# Patient Record
Sex: FEMALE | Race: BLACK OR AFRICAN AMERICAN | NOT HISPANIC OR LATINO | Employment: OTHER | URBAN - METROPOLITAN AREA
[De-identification: names, ages, dates, MRNs, and addresses within clinical notes are randomized per-mention and may not be internally consistent; named-entity substitution may affect disease eponyms.]

---

## 2024-02-08 ENCOUNTER — HOSPITAL ENCOUNTER (OUTPATIENT)
Dept: RADIOLOGY | Facility: CLINIC | Age: 74
Discharge: HOME | End: 2024-02-08
Payer: MEDICARE

## 2024-02-08 ENCOUNTER — OFFICE VISIT (OUTPATIENT)
Dept: GASTROENTEROLOGY | Facility: CLINIC | Age: 74
End: 2024-02-08
Payer: MEDICARE

## 2024-02-08 VITALS
SYSTOLIC BLOOD PRESSURE: 132 MMHG | OXYGEN SATURATION: 100 % | WEIGHT: 143.9 LBS | RESPIRATION RATE: 18 BRPM | BODY MASS INDEX: 22.58 KG/M2 | HEIGHT: 67 IN | TEMPERATURE: 97.5 F | DIASTOLIC BLOOD PRESSURE: 82 MMHG | HEART RATE: 64 BPM

## 2024-02-08 DIAGNOSIS — R10.11 RIGHT UPPER QUADRANT ABDOMINAL PAIN: ICD-10-CM

## 2024-02-08 DIAGNOSIS — R10.11 RIGHT UPPER QUADRANT ABDOMINAL PAIN: Primary | ICD-10-CM

## 2024-02-08 PROCEDURE — 1159F MED LIST DOCD IN RCRD: CPT | Performed by: INTERNAL MEDICINE

## 2024-02-08 PROCEDURE — 1036F TOBACCO NON-USER: CPT | Performed by: INTERNAL MEDICINE

## 2024-02-08 PROCEDURE — 74019 RADEX ABDOMEN 2 VIEWS: CPT | Performed by: RADIOLOGY

## 2024-02-08 PROCEDURE — 74019 RADEX ABDOMEN 2 VIEWS: CPT

## 2024-02-08 PROCEDURE — 99203 OFFICE O/P NEW LOW 30 MIN: CPT | Performed by: INTERNAL MEDICINE

## 2024-02-08 RX ORDER — MELOXICAM 15 MG/1
15 TABLET ORAL
COMMUNITY
Start: 2023-08-10

## 2024-02-08 RX ORDER — FAMOTIDINE 20 MG/1
20 TABLET, FILM COATED ORAL 2 TIMES DAILY
COMMUNITY
Start: 2024-02-05 | End: 2024-02-19

## 2024-02-08 RX ORDER — OMEPRAZOLE 20 MG/1
20 TABLET, DELAYED RELEASE ORAL
Qty: 30 TABLET | Refills: 0 | Status: SHIPPED | OUTPATIENT
Start: 2024-02-08 | End: 2024-03-09

## 2024-02-08 RX ORDER — ALUMINUM HYDROXIDE, MAGNESIUM HYDROXIDE, AND SIMETHICONE 1200; 120; 1200 MG/30ML; MG/30ML; MG/30ML
SUSPENSION ORAL
COMMUNITY
Start: 2024-02-05 | End: 2024-02-15

## 2024-02-08 RX ORDER — AMLODIPINE BESYLATE 2.5 MG/1
2.5 TABLET ORAL
COMMUNITY
Start: 2024-02-06

## 2024-02-08 ASSESSMENT — ENCOUNTER SYMPTOMS
ENDOCRINE NEGATIVE: 1
NEUROLOGICAL NEGATIVE: 1
CARDIOVASCULAR NEGATIVE: 1
CONSTITUTIONAL NEGATIVE: 1
RESPIRATORY NEGATIVE: 1

## 2024-02-08 NOTE — PATIENT INSTRUCTIONS
Right upper quadrant abdominal pain  -     XR abdomen 2 views supine and erect or decub; Future  -     US abdomen complete; Future  -     omeprazole OTC (PriLOSEC OTC) 20 mg EC tablet; Take 1 tablet (20 mg) by mouth once daily in the morning. Take before meals. Do not crush, chew, or split.    If no improvement in symptoms in next 3-4 days, contact my office for upper endoscopy.

## 2024-02-08 NOTE — PROGRESS NOTES
Subjective   Patient ID: Claritza Manuel is a 73 y.o. female who presents for Abdominal Pain (Pt went to [facility] Er on 02/05/24 and dx with Gastritis. States the severe pain is causing her to not be able to eat. ).  HPI  Patient was seen at [facility] emergency room on fifth February 24 with complaints of epigastric pain that started acutely the day before after she ate greasy fish and veggies.  She has a history of developing stopped stomach pain after eating greasy food. Still has pain adter meals.   Not on PPI   She had labs normal CMP, lipase normal, CBC normal overall benign urine analysis.  No cross-sectional imaging was done.  Past medical history significant for GERD hypertension s/p hysterectomy mild obstructive sleep apnea.  Last colonoscopy was in 2004 EGD 2006.    Generlaized, 5/10 not associated with N/V/D.  She has had it before and sh ehas to watch her diet.     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         Current Outpatient Medications on File Prior to Visit   Medication Sig Dispense Refill    alum-mag hydroxide-simeth (Mylanta) 200-200-20 mg/5 mL oral suspension Take 10 mL by mouth between meals and at bedtime as needed for up to 7 days.      amLODIPine (Norvasc) 2.5 mg tablet Take 1 tablet (2.5 mg) by mouth once daily.      famotidine (Pepcid) 20 mg tablet Take 1 tablet (20 mg) by mouth twice a day.      meloxicam (Mobic) 15 mg tablet Take 1 tablet (15 mg) by mouth once daily.       No current facility-administered medications on file prior to visit.        Review of Systems   Constitutional: Negative.    Respiratory: Negative.     Cardiovascular: Negative.    Endocrine: Negative.    Genitourinary: Negative.    Skin: Negative.    Neurological: Negative.        Objective   Physical Exam  Constitutional:       Appearance: Normal appearance.   HENT:      Head: Normocephalic and atraumatic.   Cardiovascular:  "     Rate and Rhythm: Normal rate and regular rhythm.      Pulses: Normal pulses.      Heart sounds: Normal heart sounds.   Pulmonary:      Effort: Pulmonary effort is normal.      Breath sounds: Normal breath sounds.   Abdominal:      General: Abdomen is flat. Bowel sounds are normal.      Palpations: Abdomen is soft.      Tenderness: There is abdominal tenderness.   Musculoskeletal:         General: Normal range of motion.      Cervical back: Normal range of motion.   Skin:     General: Skin is warm.   Neurological:      Mental Status: She is alert.       /82 (BP Location: Right arm, Patient Position: Sitting, BP Cuff Size: Adult)   Pulse 64   Temp 36.4 °C (97.5 °F) (Temporal)   Resp 18   Ht 1.702 m (5' 7\")   Wt 65.3 kg (143 lb 14.4 oz)   SpO2 100%   BMI 22.54 kg/m²      No results found for: \"WBC\", \"HGB\", \"HCT\", \"MCV\", \"PLT\"        No lab exists for component: \"LABALBU\"    No results found for: \"AFP\"  No results found for: \"TSH\"      Assessment/Plan   Diagnoses and all orders for this visit:  Right upper quadrant abdominal pain  -     XR abdomen 2 views supine and erect or decub; Future  -     US abdomen complete; Future  -     omeprazole OTC (PriLOSEC OTC) 20 mg EC tablet; Take 1 tablet (20 mg) by mouth once daily in the morning. Take before meals. Do not crush, chew, or split.    If no improvement in symptoms in next 3-4 days, contact my office for upper endoscopy.          "

## 2024-02-09 ENCOUNTER — APPOINTMENT (OUTPATIENT)
Dept: RADIOLOGY | Facility: CLINIC | Age: 74
End: 2024-02-09
Payer: MEDICARE

## 2024-02-12 ENCOUNTER — HOSPITAL ENCOUNTER (OUTPATIENT)
Dept: RADIOLOGY | Facility: CLINIC | Age: 74
Discharge: HOME | End: 2024-02-12
Payer: MEDICARE

## 2024-02-12 DIAGNOSIS — R10.11 RIGHT UPPER QUADRANT ABDOMINAL PAIN: ICD-10-CM

## 2024-02-12 PROCEDURE — 76700 US EXAM ABDOM COMPLETE: CPT

## 2024-02-12 PROCEDURE — 76700 US EXAM ABDOM COMPLETE: CPT | Performed by: RADIOLOGY

## 2024-02-13 NOTE — RESULT ENCOUNTER NOTE
X-ray abdomen is normal.  If you continue to have the abdominal pain please reach out to my office and we will schedule you for upper endoscopy.  Sincerely,

## 2024-02-13 NOTE — RESULT ENCOUNTER NOTE
Ultrasound results are completely normal.  If you continue to have abdominal pain please call my office and we can schedule you for upper endoscopy for further evaluation.  Sincerely,

## 2024-02-27 ENCOUNTER — HOSPITAL ENCOUNTER (OUTPATIENT)
Dept: RADIOLOGY | Facility: CLINIC | Age: 74
Discharge: HOME | End: 2024-02-27
Payer: MEDICARE

## 2024-02-27 ENCOUNTER — OFFICE VISIT (OUTPATIENT)
Dept: ORTHOPEDIC SURGERY | Facility: CLINIC | Age: 74
End: 2024-02-27
Payer: MEDICARE

## 2024-02-27 DIAGNOSIS — M25.552 HIP PAIN, BILATERAL: ICD-10-CM

## 2024-02-27 DIAGNOSIS — M25.551 HIP PAIN, BILATERAL: ICD-10-CM

## 2024-02-27 DIAGNOSIS — M47.818 SI JOINT ARTHRITIS: Primary | ICD-10-CM

## 2024-02-27 PROCEDURE — 99204 OFFICE O/P NEW MOD 45 MIN: CPT | Performed by: STUDENT IN AN ORGANIZED HEALTH CARE EDUCATION/TRAINING PROGRAM

## 2024-02-27 PROCEDURE — 99214 OFFICE O/P EST MOD 30 MIN: CPT | Performed by: STUDENT IN AN ORGANIZED HEALTH CARE EDUCATION/TRAINING PROGRAM

## 2024-02-27 PROCEDURE — 73521 X-RAY EXAM HIPS BI 2 VIEWS: CPT | Mod: BILATERAL PROCEDURE | Performed by: RADIOLOGY

## 2024-02-27 PROCEDURE — 1159F MED LIST DOCD IN RCRD: CPT | Performed by: STUDENT IN AN ORGANIZED HEALTH CARE EDUCATION/TRAINING PROGRAM

## 2024-02-27 PROCEDURE — 73521 X-RAY EXAM HIPS BI 2 VIEWS: CPT

## 2024-02-27 PROCEDURE — 1036F TOBACCO NON-USER: CPT | Performed by: STUDENT IN AN ORGANIZED HEALTH CARE EDUCATION/TRAINING PROGRAM

## 2024-02-27 NOTE — PROGRESS NOTES
PRIMARY CARE PHYSICIAN: No primary care provider on file.  REFERRING PROVIDER: No referring provider defined for this encounter.       SUBJECTIVE  CHIEF COMPLAINT:   Chief Complaint   Patient presents with    Left Hip - Pain    Right Hip - Pain        HPI: Claritza Manuel is a pleasant 73 y.o. year-old female who is seen today for evaluation of bilateral hip pain (R = L). The pain has been present for years. The onset of pain was not associated with a traumatic injury, although she has had several falls this past year.  Claritza Manuel states that the pain is located posteriorly bilaterally. Denies groin pain, no C sign.  Claritza Manuel denies any history of inflammatory arthritis.  The pain is aggravated by weightbearing and alleviated by rest. She has not tried formal PT, injections. She does stretching at home and takes NSAIDs as needed. The patient denies any numbness or tingling of the right lower extremity.    FUNCTIONAL STATUS: not limited.  AMBULATORY STATUS: Independent community ambulation without devices  PREVIOUS TREATMENTS: activity modification and over the counter medications   HISTORY OF SURGERY ON AFFECTED HIP(S): No   BACK PAIN REPORTED: Yes   SYMPTOMS INTERFERING WITH SLEEP: No   INSTABILITY: No   IMPACTING QUALITY OF LIFE: No     REVIEW OF SYSTEMS  The patient denies any fever, chills, chest pain, shortness of breath or difficulty breathing.  Patient denies any numbness, tingling, or radicular symptoms.  Adult patient history sheet was filled out by the patient today in clinic and will be scanned into the EMR.  I personally reviewed this form which will be scanned into the EMR.  This includes Past Medical History, Past Surgical History, Medications, Allergies, Social History, Family History and 12 point review of systems.    History reviewed. No pertinent past medical history.     Allergies   Allergen Reactions    Clindamycin Hives and Itching    Erythromycin Hives and Itching    Hay Fever And  Allergy Relief Itching    Penicillins Hives and Itching    Sulfa (Sulfonamide Antibiotics) Hives and Itching    Tetracycline Hives and Itching        Past Surgical History:   Procedure Laterality Date    BOWEL RESECTION  2005    [facility]    PARTIAL HYSTERECTOMY N/A         No family history on file.     Social History     Socioeconomic History    Marital status: Single     Spouse name: Not on file    Number of children: Not on file    Years of education: Not on file    Highest education level: Not on file   Occupational History    Not on file   Tobacco Use    Smoking status: Never    Smokeless tobacco: Never   Vaping Use    Vaping Use: Never used   Substance and Sexual Activity    Alcohol use: Yes     Alcohol/week: 1.0 standard drink of alcohol     Types: 1 Standard drinks or equivalent per week    Drug use: Never    Sexual activity: Not on file   Other Topics Concern    Not on file   Social History Narrative    Not on file     Social Determinants of Health     Financial Resource Strain: Not on file   Food Insecurity: Not on file   Transportation Needs: Not on file   Physical Activity: Not on file   Stress: Not on file   Social Connections: Not on file   Intimate Partner Violence: Not on file   Housing Stability: Not on file        CURRENT MEDICATIONS:   Current Outpatient Medications   Medication Sig Dispense Refill    amLODIPine (Norvasc) 2.5 mg tablet Take 1 tablet (2.5 mg) by mouth once daily.      famotidine (Pepcid) 20 mg tablet Take 1 tablet (20 mg) by mouth twice a day.      meloxicam (Mobic) 15 mg tablet Take 1 tablet (15 mg) by mouth once daily.      omeprazole OTC (PriLOSEC OTC) 20 mg EC tablet Take 1 tablet (20 mg) by mouth once daily in the morning. Take before meals. Do not crush, chew, or split. 30 tablet 0     No current facility-administered medications for this visit.        OBJECTIVE    PHYSICAL EXAM  There is no height or weight on file to calculate BMI.    General: Well-appearing female  in no acute distress.  Awake, alert and oriented.  Pleasant and cooperative.  Respiratory: Non-labored breathing  Mood: Euthymic   Gait: Normal  Assistive Device: None     Limb Length Discrepancy: no    Affected Right Hip  Range of motion:   Flexion: 120  Extension: 0  Internal Rotation: 25  External Rotation: 40  Hip Flexor Strength: 5/5  Abductor Strength: 5/5  Adductor Strength: 5/5  Tenderness: glute  Neg log roll  Neg stinchfield  Neg FADDIR  Neg resisted abduction  Sensation: Intact to light touch distally  Motor function: Able to fire TA, EHL, G/S  Pulses: Palpable DP pulse    Affected Left Hip  Range of motion:   Flexion: 120  Extension: 0  Internal Rotation: 25  External Rotation: 40  Hip Flexor Strength: 5/5  Abductor Strength: 5/5  Adductor Strength: 5/5  Tenderness: PSIS  Sensation: Intact to light touch distally  Motor function: Able to fire TA, EHL, G/S  Neg log roll  Neg stinchfield  Neg FADDIR  Neg resisted abduction    IMAGING:  AP pelvis, AP hip and false profile views: Independent review of bilateral hip and pelvis x-rays was performed. The findings were reviewed with the patient. There are mild degenerative changes of both hips without associated joint space narrowing, subchondral sclerosis, and osteophyte formation. No evidence of fracture, AVN, dislocation, osteomyelitis.       ASSESSMENT & PLAN    IMPRESSION:  Claritza Manuel is a 73 y.o. female w bilateral posterior pelvis pain, very mild degenerative changes bilateral hips, symptoms most consistent with sacroiliitis vs gluteal muscle group tendonitis or inflammation.    PLAN:  I had a lengthy discussion about her symptoms. The patient has well maintained joint spaces on radiograph and full ROM of the hips. It is my impression that the patients symptoms are not coming from the hip joint.     I recommend that she start first with physical therapy focused on general stretching and strengthening.     I have also provided her with a referral to  PMR for further evaluation. I suspect that a portion of the pain may be coming from the left SI joint.     The patient was seen and examined with my resident Dr. Cheung who assisted with documentation.  I independently interviewed the patient to obtain a history and performed physical examination.  I formulated the plan of care.    *This note was created using voice recognition software and was not corrected for typographical or grammatical errors.*

## 2024-03-26 ENCOUNTER — APPOINTMENT (OUTPATIENT)
Dept: PHYSICAL THERAPY | Facility: CLINIC | Age: 74
End: 2024-03-26
Payer: MEDICARE

## 2024-04-24 ENCOUNTER — EVALUATION (OUTPATIENT)
Dept: PHYSICAL THERAPY | Facility: CLINIC | Age: 74
End: 2024-04-24
Payer: MEDICARE

## 2024-04-24 DIAGNOSIS — M25.551 HIP PAIN, BILATERAL: Primary | ICD-10-CM

## 2024-04-24 DIAGNOSIS — M25.552 HIP PAIN, BILATERAL: Primary | ICD-10-CM

## 2024-04-24 PROCEDURE — 97110 THERAPEUTIC EXERCISES: CPT | Mod: GP

## 2024-04-24 PROCEDURE — 97161 PT EVAL LOW COMPLEX 20 MIN: CPT | Mod: GP

## 2024-04-24 ASSESSMENT — ENCOUNTER SYMPTOMS
DEPRESSION: 0
OCCASIONAL FEELINGS OF UNSTEADINESS: 0
LOSS OF SENSATION IN FEET: 0

## 2024-04-24 NOTE — PROGRESS NOTES
Physical Therapy Evaluation    Patient Name: Claritza Manuel  MRN: [id number]  Today's Date: 4/24/24  Time Calculation  Start Time: 1349  Stop Time: 1430  Time Calculation (min): 41 min  PT Evaluation Time Entry  PT Evaluation (Low) Time Entry: 20  PT Therapeutic Procedures Time Entry  Therapeutic Exercise Time Entry: 21        Insurance: [employer]  Plan of Care: 4/24/24 to 7/23/24  Visit #1    Referring MD Dr. Dawson Nesbitt  Imaging Performed X-rays show mild degenerative changes B hips    Assessment     Claritza Manuel is a 73 y.o. referred for bilateral hip/lower back pain, right worse than left. Patient demonstrates decreased R hip mobility during gait, altered squat mechanics, decreased bilateral hip strength (more on R), pain with lumbar sidebend ROM, tenderness in R buttock, and pain. At this time, patient is limited with sitting for long periods, getting moving in the AM, and exercising. Patient will benefit from physical therapy services to address stated impairments, improve functional mobility, and return to prior level of function.    Plan  Treatment/Interventions: Cryotherapy, Education/ Instruction, Gait training, Hot pack, Manual therapy, Neuromuscular re-education, Self care/ home management, Therapeutic exercises  PT Plan: Skilled PT  PT Frequency: 1 time per week  Duration: 8 weeks  Onset Date: 11/01/23  Certification Period Start Date: 04/24/24  Certification Period End Date: 07/23/24  Number of Treatments Authorized: Med Nec  Rehab Potential: Good  Plan of Care Agreement: Patient    Primary diagnosis: Hip pain bilateral  Current Problem  1. Hip pain, bilateral  Referral to Physical Therapy    Follow Up In Physical Therapy          General:  General  Reason for Referral: Bilateral post hip/lower back pain  Referred By: Dawson Nesbitt  Past Medical History Relevant to Rehab: Hx of some hip pain previously  Preferred Learning Style: verbal, visual, written  Precautions:  Precautions  STEADI Fall Risk Score (The  score of 4 or more indicates an increased risk of falling): 1  Precautions Comment: HBP  Vital Signs:       Subjective:  Chief complaints: R > L post hip/lower back pain  Onset/Surgery Date: November 11/23  Mechanism of Injury: Potentially from falls  Previous History: Yes did have some hip pain previously  Personal Factors that may impact care: HBP     Pain:  Current: 3/10 Best: 1/10 Worst: 6/10   Location: R post hip/lower back   Type: Tight, sharp   Aggravators: Pressure on the spot from work belt/clip, sitting for long periods, AMs   Alleviators: Walking, light stretching, tylenol/ibuprofen   Numbness/tingling? No    Function:   Work/Recreation: Works in security, walking, meditation, working out   Prior Level: Full   Current limitations: sitting for long periods, wearing belt w/clip at work, working out, AMs   Condition: Worsening/Unchanged     Home Situation: House   Stairs: yes 2 floors   Lives alone   No concerns about home set up    Any falls in the past year? Yes     Injuries? No    Fear of falling? No    Sleep:    Getting to sleep No   Disturbed No   Preferred position(s):      Goals for Therapy:    Decrease pain    Objective   Palpation: Tender R > L buttock, no SIJ tenderness     ROM/Flexibility:    Lumbar  Flexion: 90      Extension: 10      Sidebend R / L: 20 / 20, pain on side leaning towards      Rotation R / L: 100% / 100%      Hip R / L Flexion: 100 / 100      Ext Rot: 45, pain / 45      Int Rot: 30, pain/ 30     Strength R / L:     Hip Flexion:     4-, pain / 4    Hip Extension:     5 / 5     Glute Med:       4- / 4    Glute Min:     4- / 4    Hip Adduction:    5 / 5    Knee Extension:   5 / 5    Knee Flexion:       5 / 5    Ankle DF:             5 / 5    Ankle PF:              5 / 5     Neurological: Sensation is intact and symmetrical in BLEs.     Gait: Decreased R hip extension, very slight decreased R stance time     Squat: leans off of R LE     Balance: 30 sec R/L, increased pain during R  "SLS     Special Tests:     Slump R / L : - / -     SLR R / L : + / -     Long sit: Negative    Figure 4 R / L:  - / -, just more tight on L    FADIR R / L: + / -    Scour R / L: + / -       Outcome Measure:    LEFS: 46 / 80      Treatment:  Therapeutic Exercise:   PPT 5\" x 10   Figure 4 + OP 10\" x 3 R/L   Figure 4 + knee to opp shoulder 10\" x 3 R/L   Hooklying clam 5\" x 20 (black)   PPT + bridge x 10   Hooklying ball squeeze 5\" x 20    Goals:  Active       PT Problem       Patient will be independent with home exercise program for home maintenance.        Start:  04/24/24    Expected End:  05/25/24            Pt will have decreased R buttock tenderness to indicate increased inflammation to facilitate exercise progression and decreased pain with sitting.        Start:  04/24/24    Expected End:  05/25/24            Pt will increase B hip strength to 5/5 to facilitate improved lumbopelvic stability for exercise and daily/work activities.        Start:  04/24/24    Expected End:  06/24/24            The patient will improve score on LEFS by 10 points to indicate decreased pain and increased functional level.         Start:  04/24/24    Expected End:  06/24/24                             "

## 2024-05-06 ENCOUNTER — TREATMENT (OUTPATIENT)
Dept: PHYSICAL THERAPY | Facility: CLINIC | Age: 74
End: 2024-05-06
Payer: MEDICARE

## 2024-05-06 DIAGNOSIS — M25.552 HIP PAIN, BILATERAL: ICD-10-CM

## 2024-05-06 DIAGNOSIS — M25.551 HIP PAIN, BILATERAL: ICD-10-CM

## 2024-05-06 PROCEDURE — 97110 THERAPEUTIC EXERCISES: CPT | Mod: GP

## 2024-05-06 NOTE — PROGRESS NOTES
"Physical Therapy    Physical Therapy Treatment    Patient Name: Claritza Manuel  MRN: [id number]  Today's Date: 5/6/2024  Time Calculation  Start Time: 0755  Stop Time: 0833  Time Calculation (min): 38 min     PT Therapeutic Procedures Time Entry  Manual Therapy Time Entry: 6  Therapeutic Exercise Time Entry: 32     Insurance: Medicare  Authorization: [place]  Certification period: 4/24/24 to 7/23/24  Visit Count: 2      Assessment:   Pt felt a little sore at end of session, but thought she would loosen up with some walking over the course of the day. Discussed possibly trying a new pair of shoes, as hers are over a year old.  Plan:   Sidelying clam    Current Problem  1. Hip pain, bilateral  Follow Up In Physical Therapy          Subjective    \"Good days and bad days, today just feeling a little tight on the R side. L side was real painful one day last week\"    Pain   1/10, stiff more than pain     Objective   Tender R glute    Treatments:  Therapeutic Exercise:     PPT 5\" x 20              Figure 4 + OP 10\" x 5 R/L              Figure 4 + knee to opp shoulder 10\" x 5 R/L   BKFO stretch 10\" x 5 R/L   LTR x 10 R/L              Hooklying clam 5\" x 20 (black)              Hooklying ball squeeze 5\" x 20   PPT + bridge 2 x 10   Squat tap to blue bench + BOSU 2 x 10   6\" Step up + march x 10 R/L   Banded lateral walking at rail x 3 laps (black above knees)  Manual Therapy   Belted hip mobs R x 6 min         Goals:  Active       PT Problem       Patient will be independent with home exercise program for home maintenance.        Start:  04/24/24    Expected End:  05/25/24            Pt will have decreased R buttock tenderness to indicate increased inflammation to facilitate exercise progression and decreased pain with sitting.        Start:  04/24/24    Expected End:  05/25/24            Pt will increase B hip strength to 5/5 to facilitate improved lumbopelvic stability for exercise and daily/work activities.        Start:  " 04/24/24    Expected End:  06/24/24            The patient will improve score on LEFS by 10 points to indicate decreased pain and increased functional level.         Start:  04/24/24    Expected End:  06/24/24

## 2024-05-13 ENCOUNTER — TREATMENT (OUTPATIENT)
Dept: PHYSICAL THERAPY | Facility: CLINIC | Age: 74
End: 2024-05-13
Payer: MEDICARE

## 2024-05-13 DIAGNOSIS — M25.551 HIP PAIN, BILATERAL: ICD-10-CM

## 2024-05-13 DIAGNOSIS — M25.552 HIP PAIN, BILATERAL: ICD-10-CM

## 2024-05-13 PROCEDURE — 97110 THERAPEUTIC EXERCISES: CPT | Mod: GP

## 2024-05-13 NOTE — PROGRESS NOTES
"Physical Therapy    Physical Therapy Treatment    Patient Name: Claritza Manuel  MRN: [id number]  Today's Date: 5/13/2024  Time Calculation  Start Time: 0706  Stop Time: 0745  Time Calculation (min): 39 min     PT Therapeutic Procedures Time Entry  Manual Therapy Time Entry: 6  Therapeutic Exercise Time Entry: 33     Insurance: Medicare  Authorization: [place]  Certification period: 4/24/24 to 7/23/24  Visit Count: 3      Assessment:   Pt felt better at end of session from stiffness on R that she came in with, but felt a little tight on the left. May benefit from manual on both sides. Challenged by sidelying clam, cues to avoid compensation with same.   Plan:   PPT w/march variation, 90/90 w/up and over    Current Problem  1. Hip pain, bilateral  Follow Up In Physical Therapy          Subjective    \"Just stiff since I'm just getting moving this morning\"    Pain   1/10, stiff more than pain     Objective   Tender R glute    Treatments:  Therapeutic Exercise:   SRB x 5 min     PPT 5\" x 20              Figure 4 + OP 10\" x 5 R/L              Figure 4 + knee to opp shoulder 10\" x 5 R/L   LTR x 10 R/L   PPT + bridge 2 x 10   Sidelying clam x 20 R/L   SLR x 20 R/L   Squat tap to blue bench + BOSU 2 x 10   6\" Step up + march x 10 R/L   Calf raise w/ball squeeze between heels x 20   Banded lateral walking at rail x 3 laps (black above knees)   Slant board calf stretch 10\" x 10   Seated HS stretch 10\" x 5 R/L  Manual Therapy   Belted hip mobs R, LAD x 6 min         Goals:  Active       PT Problem       Patient will be independent with home exercise program for home maintenance.        Start:  04/24/24    Expected End:  05/25/24            Pt will have decreased R buttock tenderness to indicate increased inflammation to facilitate exercise progression and decreased pain with sitting.        Start:  04/24/24    Expected End:  05/25/24            Pt will increase B hip strength to 5/5 to facilitate improved lumbopelvic stability for " exercise and daily/work activities.        Start:  04/24/24    Expected End:  06/24/24            The patient will improve score on LEFS by 10 points to indicate decreased pain and increased functional level.         Start:  04/24/24    Expected End:  06/24/24

## 2024-05-20 ENCOUNTER — TREATMENT (OUTPATIENT)
Dept: PHYSICAL THERAPY | Facility: CLINIC | Age: 74
End: 2024-05-20
Payer: MEDICARE

## 2024-05-20 DIAGNOSIS — M25.552 HIP PAIN, BILATERAL: ICD-10-CM

## 2024-05-20 DIAGNOSIS — M25.551 HIP PAIN, BILATERAL: ICD-10-CM

## 2024-05-20 PROCEDURE — 97110 THERAPEUTIC EXERCISES: CPT | Mod: GP

## 2024-05-20 NOTE — PROGRESS NOTES
"Physical Therapy    Physical Therapy Treatment    Patient Name: Claritza Manuel  MRN: [id number]  Today's Date: 5/20/2024  Time Calculation  Start Time: 0316  Stop Time: 0356  Time Calculation (min): 40 min     PT Therapeutic Procedures Time Entry  Therapeutic Exercise Time Entry: 40     Insurance: Medicare  [place]ization[place]  Certification period: 4/24/24 to 7/23/24  Visit Count: 4      Assessment:   Felt glute discomfort during ccamb, lateral amb, etc. But not significant enough to stop the activity. Progress bridges to fig 4 bridges.   Plan:   PPT w/march variation, 90/90 w/up and over    Current Problem  1. Hip pain, bilateral  Follow Up In Physical Therapy            Subjective    \"Just stiffness/tightness in the R glute\"    Pain   1/10, stiff more than pain     Objective   Tender R glute    Treatments:  Therapeutic Exercise:   SRB x 5 min   Ccamb 4.5 plates x 5 retro, 4 plates x 5 forward    TRX squats 2 x 10   8\" Step up + march x 10 R/L   Calf raise w/ball squeeze between heels x 20   Banded lateral walking at rail x 2 laps (black at calves)   Slant board stretch 20\" x 3    Sidelying clam x 20, cues for hip position   Fig 4 bridge 2 x 10 R/L   SLR 2 x 10 R/L     PPT 5\" x 10              Figure 4 + OP 10\" x 5 R/L              Figure 4 + knee to opp shoulder 10\" x 5 R/L         Goals:  Active       PT Problem       Patient will be independent with home exercise program for home maintenance.        Start:  04/24/24    Expected End:  05/25/24            Pt will have decreased R buttock tenderness to indicate increased inflammation to facilitate exercise progression and decreased pain with sitting.        Start:  04/24/24    Expected End:  05/25/24            Pt will increase B hip strength to 5/5 to facilitate improved lumbopelvic stability for exercise and daily/work activities.        Start:  04/24/24    Expected End:  06/24/24            The patient will improve score on LEFS by 10 points to indicate decreased " pain and increased functional level.         Start:  04/24/24    Expected End:  06/24/24

## 2024-05-28 ENCOUNTER — APPOINTMENT (OUTPATIENT)
Dept: PHYSICAL THERAPY | Facility: CLINIC | Age: 74
End: 2024-05-28
Payer: MEDICARE

## 2024-06-18 ENCOUNTER — TREATMENT (OUTPATIENT)
Dept: PHYSICAL THERAPY | Facility: CLINIC | Age: 74
End: 2024-06-18
Payer: MEDICARE

## 2024-06-18 DIAGNOSIS — M25.552 HIP PAIN, BILATERAL: Primary | ICD-10-CM

## 2024-06-18 DIAGNOSIS — M25.551 HIP PAIN, BILATERAL: Primary | ICD-10-CM

## 2024-06-18 PROCEDURE — 97140 MANUAL THERAPY 1/> REGIONS: CPT | Mod: GP,CQ | Performed by: PHYSICAL THERAPY ASSISTANT

## 2024-06-18 PROCEDURE — 97110 THERAPEUTIC EXERCISES: CPT | Mod: GP,CQ | Performed by: PHYSICAL THERAPY ASSISTANT

## 2024-06-18 NOTE — PROGRESS NOTES
"Physical Therapy    Physical Therapy Treatment    Patient Name: Claritza Manuel  MRN: [id number]  Today's Date: 6/18/2024  Time Calculation  Start Time: 1745  Stop Time: 1845  Time Calculation (min): 60 min     PT Therapeutic Procedures Time Entry  Manual Therapy Time Entry: 10  Therapeutic Exercise Time Entry: 45     Insurance: Medicare  [place]  Certification period: 4/24/24 to 7/23/24  Visit Count: 5      Assessment:  Appropriately challenged w/ S LE bridge and did not feel low back strain when lats were added. Felt difference for the  better in her R hip after manual and \"heaviness\" in L LE .   Plan:  90/90 w/up and over. Continue to monitor sacral alignment.     Current Problem  1. Hip pain, bilateral  Follow Up In Physical Therapy            Subjective    R hip / glute feels about the same. Later, reports ~ a dozen falls in the last decade including a fairly recent fall off of a ladder.     Pain  4-5/10, stiff more than pain     Objective   R sacral sulcus (most proximal)  elevated and L KJ depressed     Treatments:  Therapeutic Exercise:   SRB x 5 min   Ccamb 4.5 plates x 5 retro, 4 plates x 5 forward    TRX squats 2 x 10   8\" Step up + march x 10 R/L              PPT w/march variation x 10 reps alternating  each LE               Calf raise w/ball squeeze between heels x 20   Banded lateral walking at rail x 2 laps (black at calves)   Slant board stretch 20\" x 3    Sidelying clam x 20, cues for hip position   Fig 4 bridge  x 10 R/L--x 10 w/ S UE lats added    SLR 2 x 10 R/L     PPT 5\" x 10              Figure 4 + OP 10\" x 5 R/L              Figure 4 + knee to opp shoulder 10\" x 5 R/L    Manual :   Shot gun + R hip extension   R Sacral torsion correction            Goals:  Active       PT Problem       Patient will be independent with home exercise program for home maintenance.        Start:  04/24/24    Expected End:  05/25/24            Pt will have decreased R buttock tenderness to indicate " increased inflammation to facilitate exercise progression and decreased pain with sitting.        Start:  04/24/24    Expected End:  05/25/24            Pt will increase B hip strength to 5/5 to facilitate improved lumbopelvic stability for exercise and daily/work activities.        Start:  04/24/24    Expected End:  06/24/24            The patient will improve score on LEFS by 10 points to indicate decreased pain and increased functional level.         Start:  04/24/24    Expected End:  06/24/24

## 2024-07-08 ENCOUNTER — APPOINTMENT (OUTPATIENT)
Dept: PHYSICAL THERAPY | Facility: CLINIC | Age: 74
End: 2024-07-08
Payer: MEDICARE

## 2024-07-23 ENCOUNTER — TREATMENT (OUTPATIENT)
Dept: PHYSICAL THERAPY | Facility: CLINIC | Age: 74
End: 2024-07-23
Payer: MEDICARE

## 2024-07-23 DIAGNOSIS — M25.552 HIP PAIN, BILATERAL: Primary | ICD-10-CM

## 2024-07-23 DIAGNOSIS — M25.551 HIP PAIN, BILATERAL: Primary | ICD-10-CM

## 2024-07-23 PROCEDURE — 97140 MANUAL THERAPY 1/> REGIONS: CPT | Mod: GP,CQ | Performed by: PHYSICAL THERAPY ASSISTANT

## 2024-07-23 PROCEDURE — 97110 THERAPEUTIC EXERCISES: CPT | Mod: GP,CQ | Performed by: PHYSICAL THERAPY ASSISTANT

## 2024-07-23 NOTE — PROGRESS NOTES
"Physical Therapy    Physical Therapy Treatment    Patient Name: Claritza Manuel  MRN: [id number]  Today's Date: 7/23/2024  Time Calculation  Start Time: 1400  Stop Time: 1445  Time Calculation (min): 45 min     PT Therapeutic Procedures Time Entry  Therapeutic Exercise Time Entry: 45     Insurance: Medicare  [place]ization[place]  Certification period: 4/24/24 to 7/23/24  Visit Count: 6      Assessment:  SI easily irritated w/ lateral forces during core walk outs but tolerated forward planks w/o c/o.   Plan:  Lifting body mechanics for #20 cat litter bags. 90/90 w/up and over. Continue to monitor sacral alignment.     Current Problem  Problem List Items Addressed This Visit    None  Visit Diagnoses         Codes    Hip pain, bilateral    -  Primary M25.551, M25.552                Subjective    Feeling better except low back tightness radiating to her navel first thing in the morning.     Pain  3/10 R     Objective   B sacrum level   R ASIS posteriorly rotated     Treatments:  Therapeutic Exercise:              SI MET hip add/abd 10 reps x 5 sec hold +extension R LE 20 sec x 2               Bridging x 10               S LE w/ bridge  w/ S UE lats  x 10 R/L              Core walk-outs w/ 20# x 5 reps 3 steps R/L               Plank on elbows 2 sec hold x 10 reps               SI MET hip add/abd 10 reps x 5 sec hold +extension R LE 20 sec x 2                Shot gun + contract/relax extension x 3 min     Not today :    SRB x 5 min   Ccamb 4.5 plates x 5 retro, 4 plates x 5 forward    TRX squats 2 x 10   8\" Step up + march x 10 R/L              PPT w/march variation x 10 reps alternating  each LE               Calf raise w/ball squeeze between heels x 20   Banded lateral walking at rail x 2 laps (black at calves)   Slant board stretch 20\" x 3    Sidelying clam x 20, cues for hip position   SLR 2 x 10 R/L     PPT 5\" x 10              Figure 4 + OP 10\" x 5 R/L              Figure 4 + knee to opp shoulder 10\" x 5 " R/L             Goals:  Active       PT Problem       Patient will be independent with home exercise program for home maintenance.        Start:  04/24/24    Expected End:  05/25/24            Pt will have decreased R buttock tenderness to indicate increased inflammation to facilitate exercise progression and decreased pain with sitting.        Start:  04/24/24    Expected End:  05/25/24            Pt will increase B hip strength to 5/5 to facilitate improved lumbopelvic stability for exercise and daily/work activities.        Start:  04/24/24    Expected End:  06/24/24            The patient will improve score on LEFS by 10 points to indicate decreased pain and increased functional level.         Start:  04/24/24    Expected End:  06/24/24

## 2024-08-05 ENCOUNTER — TREATMENT (OUTPATIENT)
Dept: PHYSICAL THERAPY | Facility: CLINIC | Age: 74
End: 2024-08-05
Payer: MEDICARE

## 2024-08-05 DIAGNOSIS — M25.552 HIP PAIN, BILATERAL: ICD-10-CM

## 2024-08-05 DIAGNOSIS — M25.551 HIP PAIN, BILATERAL: ICD-10-CM

## 2024-08-05 PROCEDURE — 97140 MANUAL THERAPY 1/> REGIONS: CPT | Mod: GP,CQ | Performed by: PHYSICAL THERAPY ASSISTANT

## 2024-08-05 PROCEDURE — 97110 THERAPEUTIC EXERCISES: CPT | Mod: GP,CQ | Performed by: PHYSICAL THERAPY ASSISTANT

## 2024-08-05 NOTE — PROGRESS NOTES
"Physical Therapy    Physical Therapy Treatment    Patient Name: Claritza Manuel  MRN: [id number]  Today's Date: 8/5/2024   [id number]   Therapeutic exercise: 30 min  Manual Therapy :15 min              Insurance: Medicare  [place]  Certification period: 4/24/24 to 7/23/24  Visit Count: 7      Assessment:  Symmetrical gait with even hip sway and equal weightbearing after manual but states only slight change in symptoms.   Plan:  Lifting body mechanics for #20 cat litter bags. 90/90 w/up and over. Continue to monitor sacral alignment.     Current Problem  Problem List Items Addressed This Visit    None  Visit Diagnoses         Codes    Hip pain, bilateral     M25.551, M25.552                  Subjective    Somedays is does not hurt---she feels better since starting PT. States she has had several falls in her career , including falling off of a ladder.     Pain  3/10 R     Objective   R inomimate upslip  R ASIS ant rotated     Treatments:  Manual :   Upslip mob   Shot gun + R Ext iso     Therapeutic Exercise:              SI MET hip add/abd 10 reps x 5 sec hold +extension R LE 20 sec x 2               Bridging x 10               S LE w/ bridge  w/ S UE lats  2 x 10 R/L              Core walk-outs w/ 20# x 5 reps 3 steps R/L --stop d/t increased R hip symptoms               Plank on elbows 2 sec hold x 10 reps                   Not today :    SRB x 5 min   Ccamb 4.5 plates x 5 retro, 4 plates x 5 forward    TRX squats 2 x 10   8\" Step up + march x 10 R/L              PPT w/march variation x 10 reps alternating  each LE               Calf raise w/ball squeeze between heels x 20   Banded lateral walking at rail x 2 laps (black at calves)   Slant board stretch 20\" x 3    Sidelying clam x 20, cues for hip position   SLR 2 x 10 R/L     PPT 5\" x 10              Figure 4 + OP 10\" x 5 R/L              Figure 4 + knee to opp shoulder 10\" x 5 R/L             Goals:  Active       PT Problem       Patient will be independent " with home exercise program for home maintenance.        Start:  04/24/24    Expected End:  05/25/24            Pt will have decreased R buttock tenderness to indicate increased inflammation to facilitate exercise progression and decreased pain with sitting.        Start:  04/24/24    Expected End:  05/25/24            Pt will increase B hip strength to 5/5 to facilitate improved lumbopelvic stability for exercise and daily/work activities.        Start:  04/24/24    Expected End:  06/24/24            The patient will improve score on LEFS by 10 points to indicate decreased pain and increased functional level.         Start:  04/24/24    Expected End:  06/24/24

## 2024-08-19 ENCOUNTER — TREATMENT (OUTPATIENT)
Dept: PHYSICAL THERAPY | Facility: CLINIC | Age: 74
End: 2024-08-19
Payer: MEDICARE

## 2024-08-19 DIAGNOSIS — M25.551 HIP PAIN, BILATERAL: ICD-10-CM

## 2024-08-19 DIAGNOSIS — M25.552 HIP PAIN, BILATERAL: ICD-10-CM

## 2024-08-19 PROCEDURE — 97110 THERAPEUTIC EXERCISES: CPT | Mod: GP

## 2024-08-19 NOTE — PROGRESS NOTES
Physical Therapy    Physical Therapy Treatment    Patient Name: Claritza Manuel  MRN: [id number]  Today's Date: 8/19/2024  Time Calculation  Start Time: 0915  Stop Time: 0958  Time Calculation (min): 43 min       PT Therapeutic Procedures Time Entry  Therapeutic Exercise Time Entry: 43     Insurance: Medicare  [place]ization[place]  Certification period: 4/24/24 to 7/23/24  Visit Count: 8      Assessment:  Felt hamstring and hip flexor stretches were helpful so added these to HEP. Decreased weight for lat core walk outs and this was more tolerable. Reported feeling better at end of session. Demo's improved ROM and strength in R hip, but still painful at end range and to resisted hip flexion.   Plan:  Lifting body mechanics for #20 cat litter bags. Continue to monitor sacral alignment.     Current Problem  Problem List Items Addressed This Visit    None  Visit Diagnoses         Codes    Hip pain, bilateral     M25.551, M25.552                  Subjective    Some days it hurts and some days it doesn't when I wake up.     Pain  3-4/10 R     Objective   ROM/Flexibility:                          Lumbar            Flexion: 90                                                  Extension: 10                                                  Sidebend R / L: 20 / 20                                                  Rotation R / L: 100% / 100%                             Hip R / LFlexion: 100 / 100                                                  Ext Rot: 45 / 45                                                  Int Rot: 40, pain end range / 45     Strength R / L:                           Hip Flexion:          4+, pain / 5    Treatments:  Therapeutic Exercise:              SI MET hip add/abd 10 reps x 5 sec hold     SI MET extension 10 reps x 5 sec hold   90/90 w/yellow ball up and over 2 x 10              S LE w/ bridge  w/ S UE lats  2 x 10 R/L   TRX squats 2 x 10              Core walk-outs w/ 15# x 5 reps 3 steps R/L   HS Curl DL 33#  "2 x 10   Sidelying clam 3\" x 20 ea   Supine HS stretch w/strap 30\" x 2 ea   Prone hip flexor stretch w/strap 30\" x 2 ea                  Not today :    SRB x 5 min   Ccamb 4.5 plates x 5 retro, 4 plates x 5 forward    8\" Step up + march x 10 R/L              PPT w/march variation x 10 reps alternating  each LE               Calf raise w/ball squeeze between heels x 20   Banded lateral walking at rail x 2 laps (black at calves)   Slant board stretch 20\" x 3    SLR 2 x 10 R/L     PPT 5\" x 10              Figure 4 + OP 10\" x 5 R/L              Figure 4 + knee to opp shoulder 10\" x 5 R/L             Goals:  Active       PT Problem       Patient will be independent with home exercise program for home maintenance.        Start:  04/24/24    Expected End:  05/25/24            Pt will have decreased R buttock tenderness to indicate increased inflammation to facilitate exercise progression and decreased pain with sitting.        Start:  04/24/24    Expected End:  05/25/24            Pt will increase B hip strength to 5/5 to facilitate improved lumbopelvic stability for exercise and daily/work activities.        Start:  04/24/24    Expected End:  06/24/24            The patient will improve score on LEFS by 10 points to indicate decreased pain and increased functional level.         Start:  04/24/24    Expected End:  06/24/24              "

## 2024-09-09 ENCOUNTER — APPOINTMENT (OUTPATIENT)
Dept: PHYSICAL THERAPY | Facility: CLINIC | Age: 74
End: 2024-09-09
Payer: MEDICARE

## 2024-09-23 ENCOUNTER — TREATMENT (OUTPATIENT)
Dept: PHYSICAL THERAPY | Facility: CLINIC | Age: 74
End: 2024-09-23
Payer: MEDICARE

## 2024-09-23 DIAGNOSIS — M25.551 HIP PAIN, BILATERAL: ICD-10-CM

## 2024-09-23 DIAGNOSIS — M25.552 HIP PAIN, BILATERAL: ICD-10-CM

## 2024-09-23 PROCEDURE — 97110 THERAPEUTIC EXERCISES: CPT | Mod: GP

## 2024-09-23 NOTE — PROGRESS NOTES
"Physical Therapy    Physical Therapy Treatment    Patient Name: Claritza Manuel  MRN: [id number]  Today's Date: 9/23/2024  Time Calculation  Start Time: 0937  Stop Time: 1003  Time Calculation (min): 26 min       PT Therapeutic Procedures Time Entry  Manual Therapy Time Entry: 6  Therapeutic Exercise Time Entry: 20     Insurance: Medicare  [place]  Certification period: 4/24/24 to 7/23/24  Visit Count: 9      Assessment:  Given length of time patient has been working with PT and emergence of new symptoms since last visit, I recommended patient return to her PCP for further work up. I encouraged her to continue doing exercises from HEP that do not increase her pain.   Plan:  Recommend patient follow up with PCP regarding new symptoms    Current Problem  Problem List Items Addressed This Visit    None  Visit Diagnoses         Codes    Hip pain, bilateral     M25.551, M25.552                  Subjective    \"My previous pain is gone, but now I have aching in my thighs and my R hamstring hurts\"    Pain  3-4/10 R     Objective   ROM/Flexibility:                          Lumbar            Flexion: 90 pain in R hamstring                                                  Extension: 10                                                  Sidebend R / L: 20 / 20                                                  Rotation R / L: 100% / 100%                              Treatments:  Therapeutic Exercise:              SI MET hip add/abd 10 reps x 5 sec hold     SI MET extension 10 reps x 5 sec hold   PPT 5\" x 20   Prone hip flexor stretch x 1 min ea  IASTM to R hamstring x 6 min  Supine active HS stretch 5\" x 10        Goals:  Active       PT Problem       Patient will be independent with home exercise program for home maintenance.        Start:  04/24/24    Expected End:  05/25/24            Pt will have decreased R buttock tenderness to indicate increased inflammation to facilitate exercise progression and decreased pain with " sitting.        Start:  04/24/24    Expected End:  05/25/24            Pt will increase B hip strength to 5/5 to facilitate improved lumbopelvic stability for exercise and daily/work activities.        Start:  04/24/24    Expected End:  06/24/24            The patient will improve score on LEFS by 10 points to indicate decreased pain and increased functional level.         Start:  04/24/24    Expected End:  06/24/24

## 2025-03-17 ENCOUNTER — OFFICE VISIT (OUTPATIENT)
Dept: URGENT CARE | Age: 75
End: 2025-03-17
Payer: MEDICARE

## 2025-03-17 VITALS
HEART RATE: 64 BPM | SYSTOLIC BLOOD PRESSURE: 154 MMHG | BODY MASS INDEX: 22.29 KG/M2 | WEIGHT: 142 LBS | OXYGEN SATURATION: 98 % | RESPIRATION RATE: 19 BRPM | HEIGHT: 67 IN | DIASTOLIC BLOOD PRESSURE: 73 MMHG | TEMPERATURE: 97.3 F

## 2025-03-17 DIAGNOSIS — M17.12 PRIMARY LOCALIZED OSTEOARTHROSIS OF THE KNEE, LEFT: Primary | ICD-10-CM

## 2025-03-17 PROCEDURE — 1159F MED LIST DOCD IN RCRD: CPT | Performed by: SPECIALIST

## 2025-03-17 PROCEDURE — 3008F BODY MASS INDEX DOCD: CPT | Performed by: SPECIALIST

## 2025-03-17 PROCEDURE — 1036F TOBACCO NON-USER: CPT | Performed by: SPECIALIST

## 2025-03-17 PROCEDURE — 99203 OFFICE O/P NEW LOW 30 MIN: CPT | Performed by: SPECIALIST

## 2025-03-17 RX ORDER — CAPSAICIN 0 G/G
CREAM TOPICAL
Qty: 56.6 G | Refills: 0 | Status: SHIPPED | OUTPATIENT
Start: 2025-03-17

## 2025-03-17 RX ORDER — METHYLPREDNISOLONE 4 MG/1
TABLET ORAL
Qty: 21 TABLET | Refills: 0 | Status: SHIPPED | OUTPATIENT
Start: 2025-03-17 | End: 2025-03-23

## 2025-03-17 ASSESSMENT — ENCOUNTER SYMPTOMS
CONSTITUTIONAL NEGATIVE: 1
ARTHRALGIAS: 1

## 2025-03-17 NOTE — PATIENT INSTRUCTIONS
"R.I.C.E.    The general care of your injury includes the following: Resting, Icing, Compressing and Elevating the injured area. Remember this as \"RICE.\"    REST: Limit the use of the injured body part.  ICE: By applying ice to the affected area, swelling and pain can be reduced. Place some ice cubes in a re-sealable (Ziploc) bag and add some water. Put a thin washcloth between the bag and your skin. Apply the ice bag to the area for at least 20 minutes. Do this at least 4 times per day. Using the ice for longer times and more frequently is OK. NEVER APPLY ICE DIRECTLY TO THE SKIN.  COMPRESS: Compression means to apply pressure around the injured area such as with a splint, cast or an ace bandage.   Compression decreases swelling and improves comfort.   Compression should be tight enough to relieve swelling but not so tight as to decrease circulation. Increasing pain, numbness, tingling, or change in skin color, are all signs of decreased circulation.  ELEVATE: Elevate the injured part. For example, elevate your foot by placing it on a chair while sitting, or propping it up on pillows when lying down.     Take 2 Tylenol as needed up to 3 times a day    Follow up with your PCP in a week to discuss your options for Physical Therapy.  "

## 2025-03-17 NOTE — PROGRESS NOTES
"Subjective   Patient ID: Claritza Manuel is a 74 y.o. female. They present today with a chief complaint of Knee Pain (Left knee pain  /Pt states that her pain level can be between 6 or 7).    History of Present Illness    Knee Pain         Past Medical History  Allergies as of 03/17/2025 - Reviewed 03/17/2025   Allergen Reaction Noted    Clindamycin Hives and Itching 02/08/2024    Erythromycin Hives and Itching 02/08/2024    Hay fever and allergy relief Itching 02/08/2024    Penicillins Hives and Itching 02/08/2024    Sulfa (sulfonamide antibiotics) Hives and Itching 02/08/2024    Tetracycline Hives and Itching 02/08/2024       (Not in a hospital admission)       No past medical history on file.    Past Surgical History:   Procedure Laterality Date    BOWEL RESECTION  2005    [facility]    PARTIAL HYSTERECTOMY N/A         reports that she has never smoked. She has never used smokeless tobacco. She reports current alcohol use of about 1.0 standard drink of alcohol per week. She reports that she does not use drugs.    Review of Systems  Review of Systems   Constitutional: Negative.    Musculoskeletal:  Positive for arthralgias.                                  Objective    Vitals:    03/17/25 1452   BP: 154/73   Pulse: 64   Resp: 19   Temp: 36.3 °C (97.3 °F)   TempSrc: Oral   SpO2: 98%   Weight: 64.4 kg (142 lb)   Height: 1.702 m (5' 7.01\")     No LMP recorded (exact date). Patient is postmenopausal.    Physical Exam  Constitutional:       Appearance: Normal appearance.   Cardiovascular:      Rate and Rhythm: Normal rate and regular rhythm.   Musculoskeletal:      Left knee: Tenderness present over the patellar tendon.   Neurological:      Mental Status: She is alert.         Procedures    Point of Care Test & Imaging Results from this visit  No results found for this visit on 03/17/25.   No results found.    Diagnostic study results (if any) were reviewed by Leana Good MD.    Assessment/Plan   Allergies, " medications, history, and pertinent labs/EKGs/Imaging reviewed by Leana Good MD.     Medical Decision Making   Osteoarthritic pain of left knee, Physical Therapy might help her pain, also will be treated with oral steroids.    Orders and Diagnoses  There are no diagnoses linked to this encounter.    Medical Admin Record      Patient disposition: Home    Electronically signed by Leana Good MD  3:09 PM